# Patient Record
Sex: FEMALE | Race: WHITE | NOT HISPANIC OR LATINO | Employment: UNEMPLOYED | URBAN - NONMETROPOLITAN AREA
[De-identification: names, ages, dates, MRNs, and addresses within clinical notes are randomized per-mention and may not be internally consistent; named-entity substitution may affect disease eponyms.]

---

## 2023-10-12 PROBLEM — I10 PRIMARY HYPERTENSION: Status: ACTIVE | Noted: 2023-10-12

## 2023-10-12 PROBLEM — R00.0 TACHYCARDIA: Status: ACTIVE | Noted: 2023-10-12

## 2023-10-12 PROBLEM — G47.33 OBSTRUCTIVE SLEEP APNEA SYNDROME: Status: ACTIVE | Noted: 2023-10-12

## 2023-10-12 PROBLEM — E03.9 HYPOTHYROIDISM: Status: ACTIVE | Noted: 2023-10-12

## 2023-10-12 PROBLEM — E78.5 HYPERLIPIDEMIA: Status: ACTIVE | Noted: 2023-10-12

## 2023-10-12 PROBLEM — I48.0 PAROXYSMAL ATRIAL FIBRILLATION (MULTI): Status: ACTIVE | Noted: 2023-10-12

## 2023-10-12 RX ORDER — DULOXETIN HYDROCHLORIDE 30 MG/1
30 CAPSULE, DELAYED RELEASE ORAL DAILY
COMMUNITY
Start: 2022-05-13

## 2023-10-12 RX ORDER — PHENOL/SODIUM PHENOLATE
20 AEROSOL, SPRAY (ML) MUCOUS MEMBRANE
COMMUNITY
Start: 2019-02-07

## 2023-10-12 RX ORDER — ALLOPURINOL 300 MG/1
300 TABLET ORAL DAILY
COMMUNITY
Start: 2022-08-14

## 2023-10-12 RX ORDER — AMITRIPTYLINE HYDROCHLORIDE 50 MG/1
1 TABLET, FILM COATED ORAL NIGHTLY
COMMUNITY
End: 2023-11-17 | Stop reason: WASHOUT

## 2023-10-12 RX ORDER — CLONIDINE HYDROCHLORIDE 0.2 MG/1
0.2 TABLET ORAL DAILY
COMMUNITY
Start: 2022-08-14 | End: 2023-10-30

## 2023-10-12 RX ORDER — FLECAINIDE ACETATE 50 MG/1
1 TABLET ORAL EVERY 12 HOURS
COMMUNITY
Start: 2022-09-13 | End: 2023-12-11 | Stop reason: SDUPTHER

## 2023-10-12 RX ORDER — ROSUVASTATIN CALCIUM 10 MG/1
1 TABLET, COATED ORAL NIGHTLY
COMMUNITY
End: 2023-10-30

## 2023-10-12 RX ORDER — AMLODIPINE BESYLATE 10 MG/1
1 TABLET ORAL DAILY
COMMUNITY
Start: 2022-10-12 | End: 2023-11-17 | Stop reason: SDUPTHER

## 2023-10-12 RX ORDER — LEVOTHYROXINE SODIUM 175 UG/1
175 TABLET ORAL
COMMUNITY
Start: 2023-08-13

## 2023-10-12 RX ORDER — SPIRONOLACTONE 50 MG/1
1 TABLET, FILM COATED ORAL DAILY
COMMUNITY
Start: 2022-10-12 | End: 2023-10-30

## 2023-10-12 RX ORDER — RIVAROXABAN 20 MG/1
1 TABLET, FILM COATED ORAL DAILY
COMMUNITY
Start: 2022-09-13 | End: 2023-10-30

## 2023-10-12 RX ORDER — METOPROLOL SUCCINATE 50 MG/1
1 TABLET, EXTENDED RELEASE ORAL DAILY
COMMUNITY
End: 2023-12-11 | Stop reason: SDUPTHER

## 2023-10-24 DIAGNOSIS — I10 PRIMARY HYPERTENSION: ICD-10-CM

## 2023-10-24 DIAGNOSIS — I48.0 PAROXYSMAL ATRIAL FIBRILLATION (MULTI): ICD-10-CM

## 2023-10-24 DIAGNOSIS — E78.2 MIXED HYPERLIPIDEMIA: ICD-10-CM

## 2023-10-30 RX ORDER — RIVAROXABAN 20 MG/1
20 TABLET, FILM COATED ORAL DAILY
Qty: 90 TABLET | Refills: 0 | Status: SHIPPED | OUTPATIENT
Start: 2023-10-30 | End: 2024-01-09

## 2023-10-30 RX ORDER — CLONIDINE HYDROCHLORIDE 0.2 MG/1
0.2 TABLET ORAL DAILY
Qty: 90 TABLET | Refills: 0 | Status: SHIPPED | OUTPATIENT
Start: 2023-10-30 | End: 2024-01-09

## 2023-10-30 RX ORDER — ROSUVASTATIN CALCIUM 10 MG/1
10 TABLET, COATED ORAL NIGHTLY
Qty: 90 TABLET | Refills: 0 | Status: SHIPPED | OUTPATIENT
Start: 2023-10-30 | End: 2024-01-09

## 2023-10-30 RX ORDER — SPIRONOLACTONE 50 MG/1
50 TABLET, FILM COATED ORAL DAILY
Qty: 90 TABLET | Refills: 0 | Status: SHIPPED | OUTPATIENT
Start: 2023-10-30 | End: 2024-01-09

## 2023-11-16 PROBLEM — M10.9 GOUT: Status: ACTIVE | Noted: 2023-11-16

## 2023-11-16 PROBLEM — R06.02 SHORTNESS OF BREATH: Status: ACTIVE | Noted: 2023-11-16

## 2023-11-17 ENCOUNTER — OFFICE VISIT (OUTPATIENT)
Dept: CARDIOLOGY | Facility: CLINIC | Age: 63
End: 2023-11-17
Payer: COMMERCIAL

## 2023-11-17 VITALS
HEART RATE: 74 BPM | HEIGHT: 66 IN | SYSTOLIC BLOOD PRESSURE: 136 MMHG | WEIGHT: 255 LBS | BODY MASS INDEX: 40.98 KG/M2 | DIASTOLIC BLOOD PRESSURE: 82 MMHG

## 2023-11-17 DIAGNOSIS — I10 PRIMARY HYPERTENSION: ICD-10-CM

## 2023-11-17 DIAGNOSIS — E66.01 MORBID OBESITY WITH BMI OF 40.0-44.9, ADULT (MULTI): ICD-10-CM

## 2023-11-17 DIAGNOSIS — R94.31 ABNORMAL EKG: ICD-10-CM

## 2023-11-17 DIAGNOSIS — I48.0 PAROXYSMAL ATRIAL FIBRILLATION (MULTI): Primary | Chronic | ICD-10-CM

## 2023-11-17 DIAGNOSIS — E78.2 MIXED HYPERLIPIDEMIA: ICD-10-CM

## 2023-11-17 DIAGNOSIS — R06.02 SHORTNESS OF BREATH: ICD-10-CM

## 2023-11-17 PROCEDURE — 3075F SYST BP GE 130 - 139MM HG: CPT | Performed by: INTERNAL MEDICINE

## 2023-11-17 PROCEDURE — 1036F TOBACCO NON-USER: CPT | Performed by: INTERNAL MEDICINE

## 2023-11-17 PROCEDURE — 3008F BODY MASS INDEX DOCD: CPT | Performed by: INTERNAL MEDICINE

## 2023-11-17 PROCEDURE — 3079F DIAST BP 80-89 MM HG: CPT | Performed by: INTERNAL MEDICINE

## 2023-11-17 PROCEDURE — 99214 OFFICE O/P EST MOD 30 MIN: CPT | Performed by: INTERNAL MEDICINE

## 2023-11-17 PROCEDURE — 93000 ELECTROCARDIOGRAM COMPLETE: CPT | Performed by: INTERNAL MEDICINE

## 2023-11-17 RX ORDER — VALSARTAN 80 MG/1
80 TABLET ORAL DAILY
Qty: 30 TABLET | Refills: 11 | Status: SHIPPED | OUTPATIENT
Start: 2023-11-17 | End: 2024-11-16

## 2023-11-17 RX ORDER — ROPINIROLE 2 MG/1
2 TABLET, FILM COATED ORAL NIGHTLY
COMMUNITY
Start: 2023-07-28 | End: 2024-01-24

## 2023-11-17 ASSESSMENT — ENCOUNTER SYMPTOMS
DIZZINESS: 1
SHORTNESS OF BREATH: 1

## 2023-11-17 NOTE — PATIENT INSTRUCTIONS
Please bring all medicines, vitamins, and herbal supplements with you when you come to the office.    Prescriptions will not be filled unless you are compliant with your follow up appointments or have a follow up appointment scheduled as per instruction of your physician. Refills should be requested at the time of your visit. n

## 2023-11-17 NOTE — PROGRESS NOTES
"Subjective   Pippa Chan is a 63 y.o. female       Chief Complaint    Hospital Follow-up          HPI   Patient is in the office for follow-up for the problems noted below.  She has had no recurrent atrial fibrillation while she is on flecainide and Xarelto.  She has an admission to the hospital couple months ago for atypical chest pain.  She had previous knee replacement surgery in [place] which led to chronic pain which just improved recently.  Now she is complaining of pain in the right knee for which she is seeking help from orthopedic surgery.  She is not scheduled for interventions.  She remains morbidly obese due to inactivity caused by her physical limitations.  She was scheduled for a nuclear stress test since her last visit but was never carried out.  It will be rescheduled.  EKG revealed normal sinus rhythm and normal intervals.  Pressures in the upper normal range and because of that valsartan 80 mg daily is added.    ASSESSMENT AND PLAN:      1. Paroxysmal atrial fibrillation. Currently in sinus rhythm on flecainide and Xarelto with no complications of either   2. Hypertension, in the upper normal range.  We will add valsartan 80 mg daily.  3.  Morbid obesity despite previous bariatric surgery. Encouragement provided to reduce calorie consumption  4. High-risk medication, antiarrhythmic, anticoagulation. Both have been well-tolerated  5. Hypothyroidism, on replacement therapy. Recent lab data showed that the patient has hyperthyroidism for which her PCP reduce the dose of Synthroid.  6.  Abnormal EKG with paroxysmal atrial fibrillation.  Lexiscan Cardiolite stress test is recommended     Patient will follow-up with me in the office in 6 months    Review of Systems   Respiratory:  Positive for shortness of breath.    Neurological:  Positive for dizziness.          Visit Vitals  /82 (BP Location: Right arm, Patient Position: Sitting)   Pulse 74   Ht 1.676 m (5' 6\")   Wt 116 kg (255 lb)   BMI 41.16 " kg/m²   Smoking Status Never   BSA 2.32 m²        Objective   Physical Exam    Current Medications    Current Outpatient Medications:     allopurinol (Zyloprim) 300 mg tablet, Take 1 tablet (300 mg) by mouth once daily., Disp: , Rfl:     amLODIPine (Norvasc) 10 mg tablet, TAKE 1 TABLET BY MOUTH DAILY, Disp: 90 tablet, Rfl: 3    cloNIDine (Catapres) 0.2 mg tablet, TAKE 1 TABLET BY MOUTH DAILY, Disp: 90 tablet, Rfl: 0    DULoxetine (Cymbalta) 30 mg DR capsule, Take 1 capsule (30 mg) by mouth once daily., Disp: , Rfl:     flecainide (Tambocor) 50 mg tablet, Take 1 tablet (50 mg) by mouth every 12 hours., Disp: , Rfl:     levothyroxine (Synthroid, Levoxyl) 150 mcg tablet, Take 1 tablet (150 mcg) by mouth once daily in the morning. Take before meals., Disp: , Rfl:     metoprolol succinate XL (Toprol-XL) 50 mg 24 hr tablet, Take 1 tablet (50 mg) by mouth once daily., Disp: , Rfl:     omeprazole (PriLOSEC) 40 mg DR capsule, Take 1 capsule (40 mg) by mouth once daily in the morning. Take before meals., Disp: , Rfl:     rOPINIRole (Requip) 2 mg tablet, Take 1 tablet (2 mg) by mouth once daily at bedtime., Disp: , Rfl:     rosuvastatin (Crestor) 10 mg tablet, TAKE 1 TABLET BY MOUTH AT BEDTIME, Disp: 90 tablet, Rfl: 0    spironolactone (Aldactone) 50 mg tablet, TAKE 1 TABLET BY MOUTH DAILY, Disp: 90 tablet, Rfl: 0    Xarelto 20 mg tablet, TAKE 1 TABLET BY MOUTH DAILY, Disp: 90 tablet, Rfl: 0                     Assessment/Plan   1. Paroxysmal atrial fibrillation (CMS/HCC)        2. Primary hypertension        3. Mixed hyperlipidemia        4. Morbid obesity with BMI of 40.0-44.9, adult (CMS/HCC)        5. Shortness of breath        6. Abnormal EKG

## 2023-11-20 ENCOUNTER — TELEPHONE (OUTPATIENT)
Dept: CARDIOLOGY | Facility: CLINIC | Age: 63
End: 2023-11-20
Payer: COMMERCIAL

## 2023-11-20 NOTE — TELEPHONE ENCOUNTER
Patient scheduled for stress test on 11/29/23, in chart patient had one on 09/27/23 (in media) verifying if patient still needs test or can be cancelled?

## 2023-11-21 PROBLEM — R94.39 ABNORMAL STRESS TEST: Status: ACTIVE | Noted: 2023-11-21

## 2023-11-21 NOTE — TELEPHONE ENCOUNTER
Phoned patient advised of the above. Advised to hold Xarelto 3 days prior to cath. Order to be printed and placed on Dr. Nhi Watkins MD desk in [place] for signature then to scheduling. Scheduling notified to cancel stress test.

## 2023-11-29 ENCOUNTER — APPOINTMENT (OUTPATIENT)
Dept: RADIOLOGY | Facility: CLINIC | Age: 63
End: 2023-11-29
Payer: COMMERCIAL

## 2023-11-29 ENCOUNTER — APPOINTMENT (OUTPATIENT)
Dept: CARDIOLOGY | Facility: CLINIC | Age: 63
End: 2023-11-29
Payer: COMMERCIAL

## 2023-11-30 ENCOUNTER — TELEPHONE (OUTPATIENT)
Dept: CARDIOLOGY | Facility: CLINIC | Age: 63
End: 2023-11-30
Payer: COMMERCIAL

## 2023-11-30 ENCOUNTER — APPOINTMENT (OUTPATIENT)
Dept: RADIOLOGY | Facility: CLINIC | Age: 63
End: 2023-11-30
Payer: COMMERCIAL

## 2023-12-01 ENCOUNTER — TELEPHONE (OUTPATIENT)
Dept: CARDIOLOGY | Facility: CLINIC | Age: 63
End: 2023-12-01
Payer: COMMERCIAL

## 2023-12-01 NOTE — TELEPHONE ENCOUNTER
Per JAYCE cancel patient heart cath on 12/6 and move to week of 12/18 due to doctor coverage. Spoke with patient and told her we will call her Monday with her dates and times. Verbalized understanding. Told patient to continue meds as normal per Tish WARD

## 2023-12-08 DIAGNOSIS — I10 PRIMARY HYPERTENSION: ICD-10-CM

## 2023-12-08 DIAGNOSIS — I48.0 PAROXYSMAL ATRIAL FIBRILLATION (MULTI): ICD-10-CM

## 2023-12-11 RX ORDER — FLECAINIDE ACETATE 50 MG/1
50 TABLET ORAL EVERY 12 HOURS
Qty: 60 TABLET | Refills: 10 | Status: SHIPPED | OUTPATIENT
Start: 2023-12-11

## 2023-12-11 RX ORDER — METOPROLOL SUCCINATE 50 MG/1
50 TABLET, EXTENDED RELEASE ORAL DAILY
Qty: 30 TABLET | Refills: 10 | Status: SHIPPED | OUTPATIENT
Start: 2023-12-11

## 2023-12-22 LAB
NON-UH HIE ANION GAP: 12.4 (ref 6–15)
NON-UH HIE BASOPHILS # (AUTO): 0 10*3/UL (ref 0–0.2)
NON-UH HIE BASOPHILS % (AUTO): 0.4 %
NON-UH HIE BLOOD UREA NITROGEN: 13 MG/DL (ref 7–25)
NON-UH HIE CARBON DIOXIDE: 26.9 MMOL/L (ref 21–31)
NON-UH HIE CHLORIDE: 103 MMOL/L (ref 98–107)
NON-UH HIE CHOL/HDL RATIO: 2.7
NON-UH HIE CHOLESTEROL: 154 MG/DL (ref 140–200)
NON-UH HIE CREATININE CLR CALC PHARMACY: 84.95
NON-UH HIE CREATININE: 0.9 MG/DL (ref 0.6–1.2)
NON-UH HIE EOSINOPHILS # (AUTO): 0.1 10*3/UL (ref 0–0.45)
NON-UH HIE EOSINOPHILS % (AUTO): 1.3 %
NON-UH HIE ESTIMATED GFR: > 60
NON-UH HIE HDL CHOLESTEROL: 57 MG/DL (ref 23–92)
NON-UH HIE HEMATOCRIT: 37.3 % (ref 34–46.4)
NON-UH HIE HEMOGLOBIN: 11.7 G/DL (ref 11.8–15.4)
NON-UH HIE INR: 1.2
NON-UH HIE LDL CHOLESTEROL,CALCULATED: 66 MG/DL (ref 0–100)
NON-UH HIE LYMPHOCYTES # (AUTO): 2.3 10*3/UL (ref 1–4.8)
NON-UH HIE LYMPHOCYTES % (AUTO): 22.6 %
NON-UH HIE MEAN CORPUSCULAR HEMOGLOBIN: 23.2 PG (ref 24.7–34.3)
NON-UH HIE MEAN CORPUSCULAR HGB CONC: 31.3 G/DL (ref 32–35)
NON-UH HIE MEAN CORPUSCULAR VOLUME: 74 FL (ref 80–100)
NON-UH HIE MEAN PLATELET VOLUME: 7.6 FL (ref 6.3–10.7)
NON-UH HIE MONOCYTES # (AUTO): 0.8 10*3/UL (ref 0–0.8)
NON-UH HIE MONOCYTES % (AUTO): 8.3 %
NON-UH HIE NEUTROPHILS # (AUTO): 6.8 10*3/UL (ref 1.8–7.7)
NON-UH HIE NEUTROPHILS % (AUTO): 67.4 %
NON-UH HIE NRBC%: 0.1 /100{WBC} (ref 0–0.5)
NON-UH HIE PARTIAL THROMBOPLASTIN TIME: 41.6 S (ref 25.1–36.5)
NON-UH HIE PLATELET COUNT: 548 10*3/UL (ref 150–450)
NON-UH HIE POTASSIUM: 3.3 MMOL/L (ref 3.5–5.1)
NON-UH HIE PROTHROMBIN TIME: 13.4 S (ref 9–12.9)
NON-UH HIE RED BLOOD COUNT: 5.04 (ref 3.6–5)
NON-UH HIE RED CELL DISTRIBUTION WIDTH: 17 % (ref 11.9–15.3)
NON-UH HIE SODIUM: 139 MMOL/L (ref 136–145)
NON-UH HIE TRIGLYCERIDE W/REFLEX: 156 MG/DL (ref 0–149)
NON-UH HIE UNCORRECTED WBC: 10.1 10*3/UL (ref 3.8–11.6)
NON-UH HIE VLDL CHOLESTEROL: 31 MG/DL
NON-UH HIE WHITE BLOOD COUNT: 10.1 10*3/UL (ref 3.8–11.6)

## 2024-01-08 DIAGNOSIS — I48.0 PAROXYSMAL ATRIAL FIBRILLATION (MULTI): ICD-10-CM

## 2024-01-08 DIAGNOSIS — I10 PRIMARY HYPERTENSION: ICD-10-CM

## 2024-01-08 DIAGNOSIS — E78.2 MIXED HYPERLIPIDEMIA: ICD-10-CM

## 2024-01-09 RX ORDER — ROSUVASTATIN CALCIUM 10 MG/1
10 TABLET, COATED ORAL NIGHTLY
Qty: 90 TABLET | Refills: 3 | Status: SHIPPED | OUTPATIENT
Start: 2024-01-09

## 2024-01-09 RX ORDER — CLONIDINE HYDROCHLORIDE 0.2 MG/1
0.2 TABLET ORAL DAILY
Qty: 90 TABLET | Refills: 3 | Status: SHIPPED | OUTPATIENT
Start: 2024-01-09

## 2024-01-09 RX ORDER — SPIRONOLACTONE 50 MG/1
50 TABLET, FILM COATED ORAL DAILY
Qty: 90 TABLET | Refills: 3 | Status: SHIPPED | OUTPATIENT
Start: 2024-01-09

## 2024-01-09 RX ORDER — RIVAROXABAN 20 MG/1
20 TABLET, FILM COATED ORAL DAILY
Qty: 90 TABLET | Refills: 3 | Status: SHIPPED | OUTPATIENT
Start: 2024-01-09

## 2024-05-30 ENCOUNTER — APPOINTMENT (OUTPATIENT)
Dept: CARDIOLOGY | Facility: CLINIC | Age: 64
End: 2024-05-30
Payer: COMMERCIAL

## 2024-06-05 ENCOUNTER — OFFICE VISIT (OUTPATIENT)
Dept: CARDIOLOGY | Facility: CLINIC | Age: 64
End: 2024-06-05
Payer: MEDICARE

## 2024-06-05 VITALS
HEIGHT: 66 IN | HEART RATE: 81 BPM | BODY MASS INDEX: 42.27 KG/M2 | WEIGHT: 263 LBS | DIASTOLIC BLOOD PRESSURE: 70 MMHG | SYSTOLIC BLOOD PRESSURE: 120 MMHG

## 2024-06-05 DIAGNOSIS — Z79.899 HIGH RISK MEDICATION USE: ICD-10-CM

## 2024-06-05 DIAGNOSIS — I10 PRIMARY HYPERTENSION: ICD-10-CM

## 2024-06-05 DIAGNOSIS — E03.9 HYPOTHYROIDISM, UNSPECIFIED TYPE: ICD-10-CM

## 2024-06-05 DIAGNOSIS — I48.0 PAROXYSMAL ATRIAL FIBRILLATION (MULTI): Primary | Chronic | ICD-10-CM

## 2024-06-05 DIAGNOSIS — E66.01 MORBID OBESITY (MULTI): ICD-10-CM

## 2024-06-05 DIAGNOSIS — E78.2 MIXED HYPERLIPIDEMIA: ICD-10-CM

## 2024-06-05 PROBLEM — R42 DIZZINESS: Status: RESOLVED | Noted: 2024-06-05 | Resolved: 2024-06-05

## 2024-06-05 PROBLEM — G47.33 OBSTRUCTIVE SLEEP APNEA SYNDROME: Status: RESOLVED | Noted: 2023-10-12 | Resolved: 2024-06-05

## 2024-06-05 PROBLEM — R00.0 TACHYCARDIA: Status: RESOLVED | Noted: 2023-10-12 | Resolved: 2024-06-05

## 2024-06-05 PROBLEM — R42 DIZZINESS: Status: ACTIVE | Noted: 2024-06-05

## 2024-06-05 PROCEDURE — 93000 ELECTROCARDIOGRAM COMPLETE: CPT | Performed by: INTERNAL MEDICINE

## 2024-06-05 PROCEDURE — 3008F BODY MASS INDEX DOCD: CPT | Performed by: INTERNAL MEDICINE

## 2024-06-05 PROCEDURE — 3078F DIAST BP <80 MM HG: CPT | Performed by: INTERNAL MEDICINE

## 2024-06-05 PROCEDURE — 3074F SYST BP LT 130 MM HG: CPT | Performed by: INTERNAL MEDICINE

## 2024-06-05 PROCEDURE — 1036F TOBACCO NON-USER: CPT | Performed by: INTERNAL MEDICINE

## 2024-06-05 PROCEDURE — 99214 OFFICE O/P EST MOD 30 MIN: CPT | Performed by: INTERNAL MEDICINE

## 2024-06-05 RX ORDER — ERGOCALCIFEROL 1.25 MG/1
1.25 CAPSULE ORAL
COMMUNITY

## 2024-06-05 RX ORDER — PRAMIPEXOLE DIHYDROCHLORIDE 0.12 MG/1
0.12 TABLET ORAL DAILY
COMMUNITY

## 2024-06-05 RX ORDER — ATOMOXETINE 25 MG/1
25 CAPSULE ORAL DAILY
COMMUNITY

## 2024-06-05 RX ORDER — AMITRIPTYLINE HYDROCHLORIDE 25 MG/1
25 TABLET, FILM COATED ORAL NIGHTLY
COMMUNITY

## 2024-06-05 NOTE — PATIENT INSTRUCTIONS
Please bring all medicines, vitamins, and herbal supplements with you when you come to the office.    Prescriptions will not be filled unless you are compliant with your follow up appointments or have a follow up appointment scheduled as per instruction of your physician. Refills should be requested at the time of your visit.     BMI was above normal measurement. Current weight: 119 kg (263 lb)  Weight change since last visit (-) denotes wt loss 8 lbs   Weight loss needed to achieve BMI 25: 108.4 Lbs  Weight loss needed to achieve BMI 30: 77.5 Lbs  Provided instructions on dietary changes  Provided instructions on exercise.

## 2024-06-05 NOTE — PROGRESS NOTES
"Subjective   Pippa Chan is a 64 y.o. female       Chief Complaint    Follow-up          HPI   Patient is in the office for follow-up for paroxysmal atrial fibrillation on flecainide and long-term anticoagulation with Xarelto.  She has other problems noted below.  She complains of occasional dyspnea on exertion however she is known to have no organic heart disease and her examination today was essentially unremarkable except for morbid obesity which I believe is the major contributor to her symptoms.  EKG today confirmed normal sinus rhythm with normal intervals.  Her recent lab data were reviewed and her numbers all look on target.  The patient had cardiac catheterization December 22, 2023 at [facility] by myself which revealed totally normal coronary arteries and that test was done since she had abnormal nuclear stress test in September 2023 showing mid anterior wall ischemia which is clearly false positive.    ASSESSMENT AND PLAN:      1. Paroxysmal atrial fibrillation. Currently in sinus rhythm on flecainide and Xarelto with no complications of either   2.  Essential hypertension, currently controlled on 5 medications  3.  Morbid obesity despite previous bariatric surgery. Encouragement provided to reduce calorie consumption  4. High-risk medication, antiarrhythmic, anticoagulation. Both have been well-tolerated  5. Hypothyroidism, on replacement therapy. Recent lab data showed that the patient has hyperthyroidism for which her PCP reduce the dose of Synthroid.  6.  Normal cardiac catheterization December 2023     Patient will follow-up with me in the office in 6 months     Review of Systems   All other systems reviewed and are negative.           Vitals:    06/05/24 1551 06/05/24 1602   BP: 94/60 120/70   BP Location: Left arm Left arm   Patient Position: Sitting Sitting   Pulse: 81    Weight: 119 kg (263 lb)    Height: 1.676 m (5' 6\")           EKG done in office today    Objective   Physical " Exam  Constitutional:       Appearance: Normal appearance.   HENT:      Nose: Nose normal.   Neck:      Vascular: No carotid bruit.   Cardiovascular:      Rate and Rhythm: Normal rate.      Pulses: Normal pulses.      Heart sounds: Normal heart sounds.   Pulmonary:      Effort: Pulmonary effort is normal.   Abdominal:      General: Bowel sounds are normal.      Palpations: Abdomen is soft.   Musculoskeletal:         General: Normal range of motion.      Cervical back: Normal range of motion.      Right lower leg: No edema.      Left lower leg: No edema.   Skin:     General: Skin is warm and dry.   Neurological:      General: No focal deficit present.      Mental Status: She is alert.   Psychiatric:         Mood and Affect: Mood normal.         Behavior: Behavior normal.         Thought Content: Thought content normal.         Judgment: Judgment normal.         Allergies  Sulfa (sulfonamide antibiotics), Aspirin, Codeine, Losartan, Nsaids (non-steroidal anti-inflammatory drug), Phenytoin sodium extended, and Trimethoprim     Current Medications    Current Outpatient Medications:     allopurinol (Zyloprim) 300 mg tablet, Take 1 tablet (300 mg) by mouth once daily., Disp: , Rfl:     amitriptyline (Elavil) 25 mg tablet, Take 1 tablet (25 mg) by mouth once daily at bedtime., Disp: , Rfl:     amLODIPine (Norvasc) 10 mg tablet, TAKE 1 TABLET BY MOUTH DAILY, Disp: 90 tablet, Rfl: 3    atomoxetine (Strattera) 25 mg capsule, Take 1 capsule (25 mg) by mouth once daily. Swallow capsule whole; do not open. If opened accidentally, do not touch eyes; wash hands immediately (product is an eye irritant)., Disp: , Rfl:     cloNIDine (Catapres) 0.2 mg tablet, TAKE 1 TABLET BY MOUTH DAILY, Disp: 90 tablet, Rfl: 3    DULoxetine (Cymbalta) 30 mg DR capsule, Take 1 capsule (30 mg) by mouth once daily., Disp: , Rfl:     ergocalciferol (Vitamin D-2) 1.25 MG (46443 UT) capsule, Take 1 capsule (1,250 mcg) by mouth 1 (one) time per week.,  Disp: , Rfl:     flecainide (Tambocor) 50 mg tablet, TAKE 1 TABLET BY MOUTH EVERY 12 HOURS, Disp: 60 tablet, Rfl: 10    levothyroxine (Synthroid, Levoxyl) 175 mcg tablet, Take 1 tablet (175 mcg) by mouth once daily in the morning. Take before meals., Disp: , Rfl:     metoprolol succinate XL (Toprol-XL) 50 mg 24 hr tablet, TAKE 1 TABLET BY MOUTH DAILY, Disp: 30 tablet, Rfl: 10    omeprazole (PriLOSEC) 20 mg tablet,delayed release (DR/EC) EC tablet, Take 1 tablet (20 mg) by mouth once daily in the morning. Take before meals., Disp: , Rfl:     pramipexole (Mirapex) 0.125 mg tablet, Take 1 tablet (0.125 mg) by mouth once daily., Disp: , Rfl:     rosuvastatin (Crestor) 10 mg tablet, TAKE 1 TABLET BY MOUTH AT BEDTIME, Disp: 90 tablet, Rfl: 3    spironolactone (Aldactone) 50 mg tablet, TAKE 1 TABLET BY MOUTH DAILY, Disp: 90 tablet, Rfl: 3    valsartan (Diovan) 80 mg tablet, Take 1 tablet (80 mg) by mouth once daily., Disp: 30 tablet, Rfl: 11    Xarelto 20 mg tablet, TAKE 1 TABLET BY MOUTH DAILY, Disp: 90 tablet, Rfl: 3    rOPINIRole (Requip) 2 mg tablet, Take 1 tablet (2 mg) by mouth once daily at bedtime., Disp: , Rfl:                      Assessment/Plan   1. Paroxysmal atrial fibrillation (Multi)  Follow Up In Cardiology      2. High risk medication use        3. Mixed hyperlipidemia        4. Primary hypertension        5. BMI 40.0-44.9, adult (Multi)                 Scribe Attestation  By signing my name below, I, Bulmaro Lobato LPN   attest that this documentation has been prepared under the direction and in the presence of Nhi Watkins MD.     Provider Attestation - Scribe documentation    All medical record entries made by the Scribe were at my direction and personally dictated by me. I have reviewed the chart and agree that the record accurately reflects my personal performance of the history, physical exam, discussion and plan.      room air

## 2024-06-05 NOTE — LETTER
June 5, 2024     Eric Gutierrez MD  [id number]  [facility] [address]    Patient: Pippa Chan   YOB: 1960   Date of Visit: 6/5/2024       Dear Dr. Eric Gutierrez MD:    Thank you for referring Pippa Chan to me for evaluation. Below are my notes for this consultation.  If you have questions, please do not hesitate to call me. I look forward to following your patient along with you.       Sincerely,     Nhi Watkins MD      CC: No Recipients  ______________________________________________________________________________________    Subjective   Pippa Chan is a 64 y.o. female       Chief Complaint    Follow-up          HPI   Patient is in the office for follow-up for paroxysmal atrial fibrillation on flecainide and long-term anticoagulation with Xarelto.  She has other problems noted below.  She complains of occasional dyspnea on exertion however she is known to have no organic heart disease and her examination today was essentially unremarkable except for morbid obesity which I believe is the major contributor to her symptoms.  EKG today confirmed normal sinus rhythm with normal intervals.  Her recent lab data were reviewed and her numbers all look on target.  The patient had cardiac catheterization December 22, 2023 at [facility] by myself which revealed totally normal coronary arteries and that test was done since she had abnormal nuclear stress test in September 2023 showing mid anterior wall ischemia which is clearly false positive.    ASSESSMENT AND PLAN:      1. Paroxysmal atrial fibrillation. Currently in sinus rhythm on flecainide and Xarelto with no complications of either   2.  Essential hypertension, currently controlled on 5 medications  3.  Morbid obesity despite previous bariatric surgery. Encouragement provided to reduce calorie consumption  4. High-risk medication, antiarrhythmic, anticoagulation. Both have been well-tolerated  5. Hypothyroidism, on replacement  "therapy. Recent lab data showed that the patient has hyperthyroidism for which her PCP reduce the dose of Synthroid.  6.  Normal cardiac catheterization December 2023     Patient will follow-up with me in the office in 6 months     Review of Systems   All other systems reviewed and are negative.           Vitals:    06/05/24 1551 06/05/24 1602   BP: 94/60 120/70   BP Location: Left arm Left arm   Patient Position: Sitting Sitting   Pulse: 81    Weight: 119 kg (263 lb)    Height: 1.676 m (5' 6\")           EKG done in office today    Objective   Physical Exam  Constitutional:       Appearance: Normal appearance.   HENT:      Nose: Nose normal.   Neck:      Vascular: No carotid bruit.   Cardiovascular:      Rate and Rhythm: Normal rate.      Pulses: Normal pulses.      Heart sounds: Normal heart sounds.   Pulmonary:      Effort: Pulmonary effort is normal.   Abdominal:      General: Bowel sounds are normal.      Palpations: Abdomen is soft.   Musculoskeletal:         General: Normal range of motion.      Cervical back: Normal range of motion.      Right lower leg: No edema.      Left lower leg: No edema.   Skin:     General: Skin is warm and dry.   Neurological:      General: No focal deficit present.      Mental Status: She is alert.   Psychiatric:         Mood and Affect: Mood normal.         Behavior: Behavior normal.         Thought Content: Thought content normal.         Judgment: Judgment normal.         Allergies  Sulfa (sulfonamide antibiotics), Aspirin, Codeine, Losartan, Nsaids (non-steroidal anti-inflammatory drug), Phenytoin sodium extended, and Trimethoprim     Current Medications    Current Outpatient Medications:   •  allopurinol (Zyloprim) 300 mg tablet, Take 1 tablet (300 mg) by mouth once daily., Disp: , Rfl:   •  amitriptyline (Elavil) 25 mg tablet, Take 1 tablet (25 mg) by mouth once daily at bedtime., Disp: , Rfl:   •  amLODIPine (Norvasc) 10 mg tablet, TAKE 1 TABLET BY MOUTH DAILY, Disp: 90 " tablet, Rfl: 3  •  atomoxetine (Strattera) 25 mg capsule, Take 1 capsule (25 mg) by mouth once daily. Swallow capsule whole; do not open. If opened accidentally, do not touch eyes; wash hands immediately (product is an eye irritant)., Disp: , Rfl:   •  cloNIDine (Catapres) 0.2 mg tablet, TAKE 1 TABLET BY MOUTH DAILY, Disp: 90 tablet, Rfl: 3  •  DULoxetine (Cymbalta) 30 mg DR capsule, Take 1 capsule (30 mg) by mouth once daily., Disp: , Rfl:   •  ergocalciferol (Vitamin D-2) 1.25 MG (09791 UT) capsule, Take 1 capsule (1,250 mcg) by mouth 1 (one) time per week., Disp: , Rfl:   •  flecainide (Tambocor) 50 mg tablet, TAKE 1 TABLET BY MOUTH EVERY 12 HOURS, Disp: 60 tablet, Rfl: 10  •  levothyroxine (Synthroid, Levoxyl) 175 mcg tablet, Take 1 tablet (175 mcg) by mouth once daily in the morning. Take before meals., Disp: , Rfl:   •  metoprolol succinate XL (Toprol-XL) 50 mg 24 hr tablet, TAKE 1 TABLET BY MOUTH DAILY, Disp: 30 tablet, Rfl: 10  •  omeprazole (PriLOSEC) 20 mg tablet,delayed release (DR/EC) EC tablet, Take 1 tablet (20 mg) by mouth once daily in the morning. Take before meals., Disp: , Rfl:   •  pramipexole (Mirapex) 0.125 mg tablet, Take 1 tablet (0.125 mg) by mouth once daily., Disp: , Rfl:   •  rosuvastatin (Crestor) 10 mg tablet, TAKE 1 TABLET BY MOUTH AT BEDTIME, Disp: 90 tablet, Rfl: 3  •  spironolactone (Aldactone) 50 mg tablet, TAKE 1 TABLET BY MOUTH DAILY, Disp: 90 tablet, Rfl: 3  •  valsartan (Diovan) 80 mg tablet, Take 1 tablet (80 mg) by mouth once daily., Disp: 30 tablet, Rfl: 11  •  Xarelto 20 mg tablet, TAKE 1 TABLET BY MOUTH DAILY, Disp: 90 tablet, Rfl: 3  •  rOPINIRole (Requip) 2 mg tablet, Take 1 tablet (2 mg) by mouth once daily at bedtime., Disp: , Rfl:                      Assessment/Plan   1. Paroxysmal atrial fibrillation (Multi)  Follow Up In Cardiology      2. High risk medication use        3. Mixed hyperlipidemia        4. Primary hypertension        5. BMI 40.0-44.9, adult (Multi)                  Scribe Attestation  By signing my name below, I, Bulmaro Lobato LPN   attest that this documentation has been prepared under the direction and in the presence of Nhi Watkins MD.     Provider Attestation - Scribe documentation    All medical record entries made by the Scribe were at my direction and personally dictated by me. I have reviewed the chart and agree that the record accurately reflects my personal performance of the history, physical exam, discussion and plan.

## 2024-10-02 DIAGNOSIS — I10 PRIMARY HYPERTENSION: ICD-10-CM

## 2024-10-03 RX ORDER — AMLODIPINE BESYLATE 10 MG/1
10 TABLET ORAL DAILY
Qty: 90 TABLET | Refills: 3 | Status: SHIPPED | OUTPATIENT
Start: 2024-10-03 | End: 2025-10-03

## 2024-10-03 RX ORDER — VALSARTAN 80 MG/1
80 TABLET ORAL DAILY
Qty: 90 TABLET | Refills: 3 | Status: SHIPPED | OUTPATIENT
Start: 2024-10-03 | End: 2025-10-03

## 2024-10-17 PROCEDURE — 93306 TTE W/DOPPLER COMPLETE: CPT | Performed by: INTERNAL MEDICINE

## 2024-10-31 DIAGNOSIS — I48.0 PAROXYSMAL ATRIAL FIBRILLATION (MULTI): ICD-10-CM

## 2024-11-01 DIAGNOSIS — I48.0 PAROXYSMAL ATRIAL FIBRILLATION (MULTI): ICD-10-CM

## 2024-11-01 DIAGNOSIS — I10 PRIMARY HYPERTENSION: ICD-10-CM

## 2024-11-01 RX ORDER — FLECAINIDE ACETATE 50 MG/1
50 TABLET ORAL EVERY 12 HOURS
Qty: 180 TABLET | Refills: 3 | Status: SHIPPED | OUTPATIENT
Start: 2024-11-01 | End: 2025-11-01

## 2024-11-04 ENCOUNTER — TELEPHONE (OUTPATIENT)
Dept: CARDIOLOGY | Facility: CLINIC | Age: 64
End: 2024-11-04
Payer: MEDICARE

## 2024-11-04 RX ORDER — METOPROLOL SUCCINATE 50 MG/1
50 TABLET, EXTENDED RELEASE ORAL DAILY
Qty: 30 TABLET | Refills: 10 | Status: SHIPPED | OUTPATIENT
Start: 2024-11-04

## 2024-11-04 NOTE — TELEPHONE ENCOUNTER
Received request for prescription refills for patient.   Patient follows with Dr. Watknis    Request is for Metoprolol  Is patient currently on medication yes    Last OV 6/5/24  Next OV 1/3/25    Pended for signing and sent to provider

## 2024-11-04 NOTE — TELEPHONE ENCOUNTER
Josh sent a care consideration    Amitriptyline and Clonidine-combo may decrease the antihypertensive effects.  To Dr. Nhi Watkins MD

## 2024-12-31 DIAGNOSIS — E78.2 MIXED HYPERLIPIDEMIA: ICD-10-CM

## 2024-12-31 DIAGNOSIS — I10 PRIMARY HYPERTENSION: ICD-10-CM

## 2024-12-31 DIAGNOSIS — I48.0 PAROXYSMAL ATRIAL FIBRILLATION (MULTI): ICD-10-CM

## 2025-01-03 ENCOUNTER — APPOINTMENT (OUTPATIENT)
Dept: CARDIOLOGY | Facility: CLINIC | Age: 65
End: 2025-01-03
Payer: MEDICARE

## 2025-01-03 RX ORDER — CLONIDINE HYDROCHLORIDE 0.2 MG/1
0.2 TABLET ORAL DAILY
Qty: 30 TABLET | Refills: 0 | Status: SHIPPED | OUTPATIENT
Start: 2025-01-03 | End: 2025-02-02

## 2025-01-03 RX ORDER — SPIRONOLACTONE 50 MG/1
50 TABLET, FILM COATED ORAL DAILY
Qty: 30 TABLET | Refills: 0 | Status: SHIPPED | OUTPATIENT
Start: 2025-01-03 | End: 2025-02-02

## 2025-01-03 RX ORDER — ROSUVASTATIN CALCIUM 10 MG/1
10 TABLET, COATED ORAL NIGHTLY
Qty: 30 TABLET | Refills: 0 | Status: SHIPPED | OUTPATIENT
Start: 2025-01-03 | End: 2025-02-02

## 2025-01-03 NOTE — TELEPHONE ENCOUNTER
Patient cancelled OV today, 1/3/25 due to illness. Patient to reschedule. Please send 30 day supply to pharmacy. Thank you

## 2025-02-04 ENCOUNTER — TELEPHONE (OUTPATIENT)
Dept: CARDIOLOGY | Facility: CLINIC | Age: 65
End: 2025-02-04
Payer: MEDICARE

## 2025-02-04 NOTE — TELEPHONE ENCOUNTER
Phoned patient and left VM to return call. Patient over due for an appointment. Needs to schedule an OV before refills can be approved.

## 2025-03-14 DIAGNOSIS — E78.2 MIXED HYPERLIPIDEMIA: ICD-10-CM

## 2025-03-14 DIAGNOSIS — I10 PRIMARY HYPERTENSION: ICD-10-CM

## 2025-03-14 DIAGNOSIS — I48.0 PAROXYSMAL ATRIAL FIBRILLATION (MULTI): ICD-10-CM

## 2025-03-14 RX ORDER — SPIRONOLACTONE 50 MG/1
50 TABLET, FILM COATED ORAL DAILY
Qty: 30 TABLET | Refills: 1 | Status: SHIPPED | OUTPATIENT
Start: 2025-03-14 | End: 2025-05-13

## 2025-03-14 RX ORDER — CLONIDINE HYDROCHLORIDE 0.2 MG/1
0.2 TABLET ORAL DAILY
Qty: 30 TABLET | Refills: 1 | Status: SHIPPED | OUTPATIENT
Start: 2025-03-14 | End: 2025-05-13

## 2025-03-14 RX ORDER — ROSUVASTATIN CALCIUM 10 MG/1
10 TABLET, COATED ORAL NIGHTLY
Qty: 30 TABLET | Refills: 1 | Status: SHIPPED | OUTPATIENT
Start: 2025-03-14 | End: 2025-05-13

## 2025-05-12 ENCOUNTER — APPOINTMENT (OUTPATIENT)
Dept: CARDIOLOGY | Facility: CLINIC | Age: 65
End: 2025-05-12
Payer: MEDICARE

## 2025-05-28 DIAGNOSIS — E78.2 MIXED HYPERLIPIDEMIA: ICD-10-CM

## 2025-05-29 DIAGNOSIS — I10 PRIMARY HYPERTENSION: ICD-10-CM

## 2025-05-29 RX ORDER — ROSUVASTATIN CALCIUM 10 MG/1
10 TABLET, COATED ORAL DAILY
Qty: 90 TABLET | Refills: 3 | Status: SHIPPED | OUTPATIENT
Start: 2025-05-29 | End: 2026-05-29

## 2025-05-29 RX ORDER — SPIRONOLACTONE 50 MG/1
50 TABLET, FILM COATED ORAL DAILY
Qty: 90 TABLET | Refills: 3 | Status: CANCELLED | OUTPATIENT
Start: 2025-05-29 | End: 2026-05-29

## 2025-06-04 DIAGNOSIS — I10 PRIMARY HYPERTENSION: ICD-10-CM

## 2025-06-04 DIAGNOSIS — I48.0 PAROXYSMAL ATRIAL FIBRILLATION (MULTI): ICD-10-CM

## 2025-06-04 DIAGNOSIS — E78.2 MIXED HYPERLIPIDEMIA: ICD-10-CM

## 2025-06-04 RX ORDER — VALSARTAN 80 MG/1
80 TABLET ORAL DAILY
Qty: 90 TABLET | Refills: 3 | Status: SHIPPED | OUTPATIENT
Start: 2025-06-04 | End: 2026-06-04

## 2025-06-04 RX ORDER — FLECAINIDE ACETATE 50 MG/1
50 TABLET ORAL EVERY 12 HOURS
Qty: 180 TABLET | Refills: 3 | Status: SHIPPED | OUTPATIENT
Start: 2025-06-04 | End: 2026-06-04

## 2025-06-04 RX ORDER — CLONIDINE HYDROCHLORIDE 0.2 MG/1
0.2 TABLET ORAL DAILY
Qty: 90 TABLET | Refills: 3 | Status: SHIPPED | OUTPATIENT
Start: 2025-06-04 | End: 2026-06-04

## 2025-06-04 RX ORDER — AMLODIPINE BESYLATE 10 MG/1
10 TABLET ORAL DAILY
Qty: 90 TABLET | Refills: 3 | Status: SHIPPED | OUTPATIENT
Start: 2025-06-04 | End: 2026-06-04

## 2025-06-04 RX ORDER — METOPROLOL SUCCINATE 50 MG/1
50 TABLET, EXTENDED RELEASE ORAL DAILY
Qty: 90 TABLET | Refills: 3 | Status: SHIPPED | OUTPATIENT
Start: 2025-06-04 | End: 2026-06-04

## 2025-06-04 RX ORDER — SPIRONOLACTONE 50 MG/1
50 TABLET, FILM COATED ORAL DAILY
Qty: 90 TABLET | Refills: 3 | Status: SHIPPED | OUTPATIENT
Start: 2025-06-04 | End: 2026-06-04

## 2025-06-04 RX ORDER — ROSUVASTATIN CALCIUM 10 MG/1
10 TABLET, COATED ORAL DAILY
Qty: 90 TABLET | Refills: 3 | Status: SHIPPED | OUTPATIENT
Start: 2025-06-04 | End: 2026-06-04

## 2025-06-30 ENCOUNTER — APPOINTMENT (OUTPATIENT)
Dept: CARDIOLOGY | Facility: CLINIC | Age: 65
End: 2025-06-30
Payer: COMMERCIAL

## 2025-06-30 VITALS
DIASTOLIC BLOOD PRESSURE: 66 MMHG | HEIGHT: 67 IN | WEIGHT: 262 LBS | SYSTOLIC BLOOD PRESSURE: 120 MMHG | HEART RATE: 69 BPM | BODY MASS INDEX: 41.12 KG/M2

## 2025-06-30 DIAGNOSIS — E03.9 ACQUIRED HYPOTHYROIDISM: ICD-10-CM

## 2025-06-30 DIAGNOSIS — Z79.899 HIGH RISK MEDICATION USE: ICD-10-CM

## 2025-06-30 DIAGNOSIS — I10 PRIMARY HYPERTENSION: ICD-10-CM

## 2025-06-30 DIAGNOSIS — E78.2 MIXED HYPERLIPIDEMIA: ICD-10-CM

## 2025-06-30 DIAGNOSIS — I48.0 PAROXYSMAL ATRIAL FIBRILLATION (MULTI): ICD-10-CM

## 2025-06-30 DIAGNOSIS — Z78.9 NEVER SMOKED TOBACCO: ICD-10-CM

## 2025-06-30 PROBLEM — Z79.01 LONG TERM (CURRENT) USE OF ANTICOAGULANTS: Status: RESOLVED | Noted: 2025-06-30 | Resolved: 2025-06-30

## 2025-06-30 PROBLEM — Z79.01 LONG TERM (CURRENT) USE OF ANTICOAGULANTS: Status: ACTIVE | Noted: 2025-06-30

## 2025-06-30 PROCEDURE — 93000 ELECTROCARDIOGRAM COMPLETE: CPT | Performed by: INTERNAL MEDICINE

## 2025-06-30 PROCEDURE — 3078F DIAST BP <80 MM HG: CPT | Performed by: INTERNAL MEDICINE

## 2025-06-30 PROCEDURE — 3074F SYST BP LT 130 MM HG: CPT | Performed by: INTERNAL MEDICINE

## 2025-06-30 PROCEDURE — 1036F TOBACCO NON-USER: CPT | Performed by: INTERNAL MEDICINE

## 2025-06-30 PROCEDURE — 99214 OFFICE O/P EST MOD 30 MIN: CPT | Performed by: INTERNAL MEDICINE

## 2025-06-30 PROCEDURE — 3008F BODY MASS INDEX DOCD: CPT | Performed by: INTERNAL MEDICINE

## 2025-06-30 NOTE — LETTER
June 30, 2025     Sean Richter DO  [address] [place] [address]    Patient: Pippa Chan   YOB: 1960   Date of Visit: 6/30/2025       Dear Dr. Sean Richter DO:    Thank you for referring Pippa Chan to me for evaluation. Below are my notes for this consultation.  If you have questions, please do not hesitate to call me. I look forward to following your patient along with you.       Sincerely,     Nhi Watkins MD      CC: No Recipients  ______________________________________________________________________________________    HPI  Patient is in the office for follow-up for paroxysmal atrial fibrillation treated successfully with flecainide and long-term anticoagulation with Xarelto.  Recently she was found to have anemia and very low iron saturation for which she is scheduled to have iron infusion.  She has had no gross blood loss anywhere.  Her lab data from October 2024 which included A1c, lipid profile, CBC and comprehensive metabolic profile were all reviewed and shared with her and her numbers are on target.  Her recent blood work did not include CBC but iron studies.  Also vitamin D and vitamin B12.  Her vitamin D level was only 30.  She started taking vitamin D supplements.  She reports no breakthrough atrial fibrillation her EKG today confirmed normal sinus rhythm and low voltage.  Her cardiac and pulmonary examinations were normal.  She remains morbidly obese BMI 41 kg/m².    ASSESSMENT AND PLAN:      1. Paroxysmal atrial fibrillation. Currently in sinus rhythm on flecainide and Xarelto with no complications of either   2.  Essential hypertension, currently controlled on 5 medications, the patient has not been able to lose weight, she has no sleep apnea and has had no renal disease and has been compliant with low-salt diet.  Encouraged the patient to lose more weight.  3.  Morbid obesity despite previous bariatric surgery. Encouragement provided to reduce calorie  "consumption and maintain active lifestyle.  4. High-risk medication, antiarrhythmic, anticoagulation. Both have been well-tolerated  5. Hypothyroidism, on replacement therapy. Recent lab data showed TSH of 13, she was taking levothyroxine with food and may have made the medicine less effective.  Her PCP suggested that she takes the medicine first thing in the morning on empty stomach and repeat the testing in the near future.  She has no clinical manifestation of hypothyroidism.  6.  Normal cardiac catheterization December 2023  7.  Low iron saturation with mild anemia without blood loss.  Had previous endoscopy which have been negative.  She does have active hemorrhoid.  She is scheduled to receive iron infusion and iron supplement as well.     ROS     Review of system essentially unremarkable    Vitals:    06/30/25 1512   BP: 120/66   BP Location: Left arm   Patient Position: Sitting   Pulse: 69   Weight: 119 kg (262 lb)   Height: 1.702 m (5' 7\")      EKG done in office today   Objective   Physical Exam  Constitutional:       Appearance: Normal appearance.   HENT:      Nose: Nose normal.   Neck:      Vascular: No carotid bruit.   Cardiovascular:      Rate and Rhythm: Normal rate.      Pulses: Normal pulses.      Heart sounds: Normal heart sounds.   Pulmonary:      Effort: Pulmonary effort is normal.   Abdominal:      General: Bowel sounds are normal.      Palpations: Abdomen is soft.   Musculoskeletal:         General: Normal range of motion.      Cervical back: Normal range of motion.      Right lower leg: No edema.      Left lower leg: No edema.   Skin:     General: Skin is warm and dry.   Neurological:      General: No focal deficit present.      Mental Status: She is alert.   Psychiatric:         Mood and Affect: Mood normal.         Behavior: Behavior normal.         Thought Content: Thought content normal.         Judgment: Judgment normal.         Allergies  Sulfa (sulfonamide antibiotics), Aspirin, " Codeine, Losartan, Nsaids (non-steroidal anti-inflammatory drug), Phenytoin sodium extended, and Trimethoprim     Current Medications  Current Outpatient Medications   Medication Instructions   • allopurinol (ZYLOPRIM) 300 mg, Daily   • amitriptyline (ELAVIL) 25 mg, Nightly   • amLODIPine (NORVASC) 10 mg, oral, Daily   • atomoxetine (STRATTERA) 25 mg, Daily   • cloNIDine (CATAPRES) 0.2 mg, oral, Daily   • DULoxetine (CYMBALTA) 30 mg, Daily   • ergocalciferol (VITAMIN D-2) 1.25 mg, Once Weekly   • flecainide (TAMBOCOR) 50 mg, oral, Every 12 hours   • levothyroxine (SYNTHROID, LEVOXYL) 175 mcg, Daily before breakfast   • metoprolol succinate XL (TOPROL-XL) 50 mg, oral, Daily   • omeprazole (PRILOSEC) 20 mg, Daily before breakfast   • pramipexole (MIRAPEX) 0.125 mg, Daily   • rivaroxaban (XARELTO) 20 mg, oral, Daily   • rOPINIRole (REQUIP) 2 mg, Nightly   • rosuvastatin (CRESTOR) 10 mg, oral, Daily   • spironolactone (ALDACTONE) 50 mg, oral, Daily   • valsartan (DIOVAN) 80 mg, oral, Daily                        Assessment/Plan   1. Paroxysmal atrial fibrillation (Multi)  Follow Up In Cardiology    ECG 12 Lead      2. High risk medication use        3. Primary hypertension        4. Mixed hyperlipidemia        5. Acquired hypothyroidism        6. Never smoked tobacco        7. BMI 40.0-44.9, adult (Multi)                 Scribe Attestation  By signing my name below, I, Bulmaro Lobato LPN   attest that this documentation has been prepared under the direction and in the presence of Nhi Watkins MD.     Provider Attestation - Scribe documentation    All medical record entries made by the Scribe were at my direction and personally dictated by me. I have reviewed the chart and agree that the record accurately reflects my personal performance of the history, physical exam, discussion and plan.

## 2025-06-30 NOTE — PROGRESS NOTES
HPI  Patient is in the office for follow-up for paroxysmal atrial fibrillation treated successfully with flecainide and long-term anticoagulation with Xarelto.  Recently she was found to have anemia and very low iron saturation for which she is scheduled to have iron infusion.  She has had no gross blood loss anywhere.  Her lab data from October 2024 which included A1c, lipid profile, CBC and comprehensive metabolic profile were all reviewed and shared with her and her numbers are on target.  Her recent blood work did not include CBC but iron studies.  Also vitamin D and vitamin B12.  Her vitamin D level was only 30.  She started taking vitamin D supplements.  She reports no breakthrough atrial fibrillation her EKG today confirmed normal sinus rhythm and low voltage.  Her cardiac and pulmonary examinations were normal.  She remains morbidly obese BMI 41 kg/m².    ASSESSMENT AND PLAN:      1. Paroxysmal atrial fibrillation. Currently in sinus rhythm on flecainide and Xarelto with no complications of either   2.  Essential hypertension, currently controlled on 5 medications, the patient has not been able to lose weight, she has no sleep apnea and has had no renal disease and has been compliant with low-salt diet.  Encouraged the patient to lose more weight.  3.  Morbid obesity despite previous bariatric surgery. Encouragement provided to reduce calorie consumption and maintain active lifestyle.  4. High-risk medication, antiarrhythmic, anticoagulation. Both have been well-tolerated  5. Hypothyroidism, on replacement therapy. Recent lab data showed TSH of 13, she was taking levothyroxine with food and may have made the medicine less effective.  Her PCP suggested that she takes the medicine first thing in the morning on empty stomach and repeat the testing in the near future.  She has no clinical manifestation of hypothyroidism.  6.  Normal cardiac catheterization December 2023  7.  Low iron saturation with mild anemia  "without blood loss.  Had previous endoscopy which have been negative.  She does have active hemorrhoid.  She is scheduled to receive iron infusion and iron supplement as well.     ROS     Review of system essentially unremarkable    Vitals:    06/30/25 1512   BP: 120/66   BP Location: Left arm   Patient Position: Sitting   Pulse: 69   Weight: 119 kg (262 lb)   Height: 1.702 m (5' 7\")      EKG done in office today   Objective   Physical Exam  Constitutional:       Appearance: Normal appearance.   HENT:      Nose: Nose normal.   Neck:      Vascular: No carotid bruit.   Cardiovascular:      Rate and Rhythm: Normal rate.      Pulses: Normal pulses.      Heart sounds: Normal heart sounds.   Pulmonary:      Effort: Pulmonary effort is normal.   Abdominal:      General: Bowel sounds are normal.      Palpations: Abdomen is soft.   Musculoskeletal:         General: Normal range of motion.      Cervical back: Normal range of motion.      Right lower leg: No edema.      Left lower leg: No edema.   Skin:     General: Skin is warm and dry.   Neurological:      General: No focal deficit present.      Mental Status: She is alert.   Psychiatric:         Mood and Affect: Mood normal.         Behavior: Behavior normal.         Thought Content: Thought content normal.         Judgment: Judgment normal.         Allergies  Sulfa (sulfonamide antibiotics), Aspirin, Codeine, Losartan, Nsaids (non-steroidal anti-inflammatory drug), Phenytoin sodium extended, and Trimethoprim     Current Medications  Current Outpatient Medications   Medication Instructions    allopurinol (ZYLOPRIM) 300 mg, Daily    amitriptyline (ELAVIL) 25 mg, Nightly    amLODIPine (NORVASC) 10 mg, oral, Daily    atomoxetine (STRATTERA) 25 mg, Daily    cloNIDine (CATAPRES) 0.2 mg, oral, Daily    DULoxetine (CYMBALTA) 30 mg, Daily    ergocalciferol (VITAMIN D-2) 1.25 mg, Once Weekly    flecainide (TAMBOCOR) 50 mg, oral, Every 12 hours    levothyroxine (SYNTHROID, LEVOXYL) " 175 mcg, Daily before breakfast    metoprolol succinate XL (TOPROL-XL) 50 mg, oral, Daily    omeprazole (PRILOSEC) 20 mg, Daily before breakfast    pramipexole (MIRAPEX) 0.125 mg, Daily    rivaroxaban (XARELTO) 20 mg, oral, Daily    rOPINIRole (REQUIP) 2 mg, Nightly    rosuvastatin (CRESTOR) 10 mg, oral, Daily    spironolactone (ALDACTONE) 50 mg, oral, Daily    valsartan (DIOVAN) 80 mg, oral, Daily                        Assessment/Plan   1. Paroxysmal atrial fibrillation (Multi)  Follow Up In Cardiology    ECG 12 Lead      2. High risk medication use        3. Primary hypertension        4. Mixed hyperlipidemia        5. Acquired hypothyroidism        6. Never smoked tobacco        7. BMI 40.0-44.9, adult (Multi)                 Scribe Attestation  By signing my name below, IZulma LPN, Scribe   attest that this documentation has been prepared under the direction and in the presence of Nhi Watkins MD.     Provider Attestation - Scribe documentation    All medical record entries made by the Scribe were at my direction and personally dictated by me. I have reviewed the chart and agree that the record accurately reflects my personal performance of the history, physical exam, discussion and plan.

## 2025-06-30 NOTE — PATIENT INSTRUCTIONS
Please bring all medicines, vitamins, and herbal supplements with you when you come to the office.    Prescriptions will not be filled unless you are compliant with your follow up appointments or have a follow up appointment scheduled as per instruction of your physician. Refills should be requested at the time of your visit.     BMI was above normal measurement. Current weight: 119 kg (262 lb)  Weight change since last visit (-) denotes wt loss -1 lbs   Weight loss needed to achieve BMI 25: 102.7 Lbs  Weight loss needed to achieve BMI 30: 70.9 Lbs  Provided instructions on dietary changes  Provided instructions on exercise.    Patient may hold xarelto 2-3 days prior to back injections

## 2026-02-13 ENCOUNTER — APPOINTMENT (OUTPATIENT)
Dept: CARDIOLOGY | Facility: CLINIC | Age: 66
End: 2026-02-13
Payer: COMMERCIAL